# Patient Record
Sex: MALE | Race: WHITE
[De-identification: names, ages, dates, MRNs, and addresses within clinical notes are randomized per-mention and may not be internally consistent; named-entity substitution may affect disease eponyms.]

---

## 2017-10-01 ENCOUNTER — HOSPITAL ENCOUNTER (EMERGENCY)
Dept: HOSPITAL 50 - VM.ED | Age: 57
Discharge: HOME | End: 2017-10-01
Payer: COMMERCIAL

## 2017-10-01 VITALS — DIASTOLIC BLOOD PRESSURE: 78 MMHG | SYSTOLIC BLOOD PRESSURE: 125 MMHG

## 2017-10-01 DIAGNOSIS — M62.838: Primary | ICD-10-CM

## 2017-10-01 PROCEDURE — 96372 THER/PROPH/DIAG INJ SC/IM: CPT

## 2017-10-01 PROCEDURE — 99283 EMERGENCY DEPT VISIT LOW MDM: CPT

## 2017-10-01 PROCEDURE — 20552 NJX 1/MLT TRIGGER POINT 1/2: CPT

## 2019-07-16 ENCOUNTER — HOSPITAL ENCOUNTER (EMERGENCY)
Dept: HOSPITAL 50 - VM.ED | Age: 59
Discharge: HOME | End: 2019-07-16
Payer: COMMERCIAL

## 2019-07-16 VITALS — DIASTOLIC BLOOD PRESSURE: 65 MMHG | SYSTOLIC BLOOD PRESSURE: 110 MMHG | HEART RATE: 78 BPM

## 2019-07-16 DIAGNOSIS — J18.1: Primary | ICD-10-CM

## 2019-07-16 DIAGNOSIS — N13.2: ICD-10-CM

## 2019-07-16 LAB
ANION GAP SERPL CALC-SCNC: 19 MMOL/L (ref 10–20)
CHLORIDE SERPL-SCNC: 97 MMOL/L (ref 98–107)
SODIUM SERPL-SCNC: 134 MMOL/L (ref 136–145)

## 2019-07-16 PROCEDURE — 99284 EMERGENCY DEPT VISIT MOD MDM: CPT

## 2019-07-16 PROCEDURE — 71046 X-RAY EXAM CHEST 2 VIEWS: CPT

## 2019-07-16 PROCEDURE — 85025 COMPLETE CBC W/AUTO DIFF WBC: CPT

## 2019-07-16 PROCEDURE — 96374 THER/PROPH/DIAG INJ IV PUSH: CPT

## 2019-07-16 PROCEDURE — 96361 HYDRATE IV INFUSION ADD-ON: CPT

## 2019-07-16 PROCEDURE — 80053 COMPREHEN METABOLIC PANEL: CPT

## 2019-07-16 PROCEDURE — 96375 TX/PRO/DX INJ NEW DRUG ADDON: CPT

## 2019-07-16 PROCEDURE — 74176 CT ABD & PELVIS W/O CONTRAST: CPT

## 2019-07-16 PROCEDURE — 83605 ASSAY OF LACTIC ACID: CPT

## 2019-07-16 PROCEDURE — 82150 ASSAY OF AMYLASE: CPT

## 2019-07-16 PROCEDURE — 83690 ASSAY OF LIPASE: CPT

## 2019-07-16 PROCEDURE — 82550 ASSAY OF CK (CPK): CPT

## 2019-07-16 NOTE — CT
______________________________________________________________________________   

  

[id number] CT/CT Abdomen Pelvis WO IV  

EXAM: ABDOMEN AND PELVIS CT WITHOUT CONTRAST  

   

 INDICATION: Abdominal pain.  

   

 COMPARISON: None.  

   

 DISCUSSION: Volume loss and possible partially imaged infiltrates in the right  

 middle lobe. Small hiatus hernia.  

   

 Small fat-containing umbilical hernia.  

   

 There is a 2 mm calculus at the left ureterovesicular junction with minor left  

 hydroureteronephrosis. Small nonobstructing calculus in the lower pole of the  

 right kidney. No obstructing calculus in the right ureter right urinary  

 collecting system dilation.  

   

 Unenhanced images of the liver, gallbladder, spleen, pancreas, adrenal glands,  

 small bowel, large bowel and appendix are unremarkable.  

   

 Degenerative disc disease at L3-L4. The osseous structures are otherwise  

 unremarkable.  

   

 IMPRESSION:    

 1.  A 2 mm calculus at the left ureterovesicular junction results in minor left  

 hydroureteronephrosis.  

   

 Electronically signed by Lefty Cannon MD on 7/16/2019 8:30 AM  

   

  

Lefty Cannon MD                 

 07/16/19 0832    

  

Thank you for allowing us to participate in the care of your patient.

## 2019-07-16 NOTE — CR
______________________________________________________________________________   

  

[id number] RAD/RAD Chest PA And Lateral  

EXAM:  RAD Chest PA And Lateral  

   

 INDICATION:  COUGH.  

   

 COMPARISON:  None.  

   

 DISCUSSION:    

   

 Subtle patchy areas of parenchymal opacification the right mid lower lung. In  

 the clinical setting of infection, findings are consistent with pneumonia. Left  

 lung is clear.  

   

 IMPRESSION:  

 Right-sided pneumonia, described above.  

   

 Electronically signed by Ezekiel Loyd MD on 7/16/2019 8:27 AM  

   

  

Ezekiel Loyd MD                 

 07/16/19 0829    

  

Thank you for allowing us to participate in the care of your patient.

## 2019-07-16 NOTE — EDM.PDOC
ED HPI GENERAL MEDICAL PROBLEM





- General


Chief Complaint: Abdominal Pain


Stated Complaint: LT SIDE PAIN


Time Seen by Provider: 07/16/19 07:05


Source of Information: Reports: Patient


History Limitations: Reports: No Limitations





- History of Present Illness


INITIAL COMMENTS - FREE TEXT/NARRATIVE: 





Patient comes into the emergency department with complaints of abdominal 

discomfort. Patient states he started having left lower quadrant pain 

approximately 4 hours ago. Patient states that the discomfort is sharp and 

shooting. It comes and goes. The discomfort gets worse when ambulating feels 

better in the fetal position. He does also endorse getting nauseated and dry 

heaving. He also states that he has the cold sweats with this. He is also 

concerned that he has an upper respiratory infection for he's been coughing and 

feeling fatigued and run down for greater than a week. He actually had an 

appointment scheduled for later today regarding this. He describes cough as 

nonproductive, fatigue, and rundown. Patient also admits to having some urinary 

hesitancy yesterday. Which is new for him. Bowel movements have been within 

normal limits for him. Denies any other concerns or complaints at this time. 


Onset: Sudden


Location: Reports: Abdomen


Quality: Reports: Sharp, Stabbing


Severity: Moderate


Improves with: Reports: Immobilization


Worsens with: Reports: Movement


Associated Symptoms: Reports: Diaphoresis, Fever/Chills


  ** Left Lower Abdomen


Pain Score (Numeric/FACES): 8





  ** Left Abdomen


Pain Score (Numeric/FACES): 8





- Related Data


 Allergies











Allergy/AdvReac Type Severity Reaction Status Date / Time


 


No Known Drug Allergies Allergy  Other Verified 07/16/19 07:20











Home Meds: 


 Home Meds





Azithromycin [Zithromax] 250 mg PO DAILY 4 Days #4 tab 07/16/19 [Rx]


Ketorolac [Toradol] 10 mg PO TID PRN #20 tab 07/16/19 [Rx]


Tamsulosin HCl [Flomax] 0.4 mg PO DAILY 14 Days #14 cap.er.24h 07/16/19 [Rx]











Past Medical History





- Past Health History


Medical/Surgical History: Denies Medical/Surgical History





- Past Surgical History


Musculoskeletal Surgical History: Reports: Arthroscopic Knee





ED ROS GENERAL





- Review of Systems


Review Of Systems: See Below


Constitutional: Reports: Chills, Diaphoresis


HEENT: Reports: No Symptoms


Respiratory: Reports: No Symptoms


Cardiovascular: Reports: No Symptoms


Endocrine: Reports: No Symptoms


GI/Abdominal: Reports: Abdominal Pain


: Reports: Urinary Retention


Musculoskeletal: Reports: No Symptoms


Skin: Reports: No Symptoms


Neurological: Reports: No Symptoms


Psychiatric: Reports: No Symptoms


Hematologic/Lymphatic: Reports: No Symptoms


Immunologic: Reports: No Symptoms





ED EXAM, GI/ABD





- Physical Exam


Exam: See Below


Exam Limited By: No Limitations


General Appearance: Alert, WD/WN, No Apparent Distress


Head: Atraumatic, Normocephalic


Neck: Normal Inspection, Supple


Respiratory/Chest: No Respiratory Distress, No Accessory Muscle Use, Chest Non-

Tender, Decreased Breath Sounds


Cardiovascular: Normal Peripheral Pulses, Regular Rate, Rhythm, No Edema


GI/Abdominal Exam: Rebound, Tender


Back Exam: Normal Inspection, Full Range of Motion


Extremities: Normal Inspection, Normal Range of Motion, Non-Tender, No Pedal 

Edema, Normal Capillary Refill


Neurological: Alert, Oriented, Normal Gait


Psychiatric: Normal Affect, Normal Mood





Course





- Vital Signs


Last Recorded V/S: 


 Last Vital Signs











Temp  36.7 C   07/16/19 07:00


 


Pulse  78   07/16/19 07:00


 


Resp  24 H  07/16/19 07:00


 


BP  110/65   07/16/19 07:00


 


Pulse Ox  94 L  07/16/19 07:00














- Orders/Labs/Meds


Orders: 


 Active Orders 24 hr











 Category Date Time Status


 


 Peripheral IV Insertion Adult [OM.PC] Stat Oth  07/16/19 07:31 Ordered











Labs: 


 Laboratory Tests











  07/16/19 07/16/19 07/16/19 Range/Units





  07:32 07:32 07:32 


 


WBC  9.8    (4.0-10.0)  x10^3/uL


 


RBC  4.10 L    (4.5-6.0)  x10^6/uL


 


Hgb  14.1    (14.0-18.0)  g/dL


 


Hct  38.7 L    (40.0-52.0)  %


 


MCV  94.4 H    (78.0-93.0)  fL


 


MCH  34.4 H    (26.0-32.0)  pg


 


MCHC  36.4 H    (32.0-36.0)  g/dL


 


RDW Coeff of Tomas  11.6    (10.0-15.0)  %


 


Plt Count  277    (130-400)  x10^3/uL


 


Add Manual Diff  Yes    


 


Neutrophils % (Manual)  78    (50-80)  %


 


Lymphocytes % (Manual)  9 L    (25-50)  %


 


Monocytes % (Manual)  9    (2-11)  %


 


Eosinophils % (Manual)  1    (0-4)  %


 


Metamyelocytes %  1 H    (0)  %


 


Myelocytes %  2 H    (0)  %


 


Platelet Estimate  Adequate    


 


Sodium   134 L   (136-145)  mmol/L


 


Potassium   4.0   (3.5-5.1)  mmol/L


 


Chloride   97 L   ()  mmol/L


 


Carbon Dioxide   22   (21-32)  mmol/L


 


Anion Gap   19.0   (10-20)  mmol/L


 


BUN   15   (7-18)  mg/dL


 


Creatinine   1.1   (0.70-1.30)  mg/dL


 


Est Cr Clr Drug Dosing   TNP   


 


Estimated GFR (MDRD)   > 60   


 


Glucose   141 H   ()  mg/dL


 


Lactic Acid    1.6  (0.4-2.0)  mmol/L


 


Calcium   9.3   (8.5-10.1)  mg/dL


 


Corrected Calcium   9.94   (8.5-10.1)  mg/dL


 


Total Bilirubin   0.9   (0.2-1.0)  mg/dL


 


AST   38 H   (15-37)  U/L


 


ALT   69 H   (16-63)  U/L


 


Alkaline Phosphatase   204 H   ()  U/L


 


Creatine Kinase   66   ()  U/L


 


Total Protein   7.5   (6.4-8.2)  g/dL


 


Albumin   3.2 L   (3.4-5.0)  g/dL


 


Globulin   4.3   


 


Albumin/Globulin Ratio   0.74   


 


Amylase   53   ()  U/L


 


Lipase   102   ()  U/L











Meds: 


Medications














Discontinued Medications














Generic Name Dose Route Start Last Admin





  Trade Name Juanq  PRN Reason Stop Dose Admin


 


Azithromycin  500 mg  07/16/19 08:45  07/16/19 08:52





  Zithromax  PO  07/16/19 08:46  500 mg





  ONETIME ONE   Administration





     





     





     





     


 


Hydromorphone HCl  0.5 mg  07/16/19 07:33  07/16/19 07:41





  Dilaudid  IVPUSH  07/16/19 07:34  0.5 mg





  ONETIME ONE   Administration





     





     





     





     


 


Sodium Chloride  1,000 mls @ 150 mls/hr  07/16/19 07:45  07/16/19 07:43





  Normal Saline  IV   150 mls/hr





  ASDIRECTED JOSE ENRIQUE   Administration





     





     





     





     


 


Ketorolac Tromethamine  15 mg  07/16/19 08:35  07/16/19 08:52





  Toradol  IVPUSH  07/16/19 08:36  15 mg





  ONETIME ONE   Administration





     





     





     





     


 


Ondansetron HCl  4 mg  07/16/19 07:33  07/16/19 07:43





  Zofran  IVPUSH  07/16/19 07:34  4 mg





  ONETIME ONE   Administration





     





     





     





     


 


Sodium Chloride  10 ml  07/16/19 07:32  





  Saline Flush  FLUSH   





  ASDIRECTED PRN   





  Keep Vein Open   





     





     





     


 


Tamsulosin HCl  0.4 mg  07/16/19 08:38  07/16/19 08:52





  Flomax  PO  07/16/19 08:39  0.4 mg





  ONETIME ONE   Administration





     





     





     





     














Departure





- Departure


Time of Disposition: 08:45


Disposition: Home, Self-Care 01


Clinical Impression: 


 Kidney stone on left side, Hydroureteronephrosis





Pneumonia


Qualifiers:


 Pneumonia type: due to unspecified organism Laterality: right Lung location: 

lower lobe of lung Qualified Code(s): J18.1 - Lobar pneumonia, unspecified 

organism








- Discharge Information


*PRESCRIPTION DRUG MONITORING PROGRAM REVIEWED*: Not Applicable


*COPY OF PRESCRIPTION DRUG MONITORING REPORT IN PATIENT JANUSZ: Not Applicable


Prescriptions: 


Azithromycin [Zithromax] 250 mg PO DAILY 4 Days #4 tab


Ketorolac [Toradol] 10 mg PO TID PRN #20 tab


 PRN Reason: Pain


Tamsulosin HCl [Flomax] 0.4 mg PO DAILY 14 Days #14 cap.er.24h


Instructions:  Kidney Stones, Easy-to-Read, Community-Acquired Pneumonia, Adult


Referrals: 


Yaneth Serrano DO [Primary Care Provider] - 


Forms:  ED Department Discharge


Additional Instructions: 


1. rest 


2. increase your water intake 


3. take antibiotics as prescribed


4. Take a probiotic while you are on antibiotics to help promote a healthy GI 

tract


5. Follow up with PCP as needed


6. Use the strainer to collect the stone and bring the stone in for analysis if 

able to capture


7. Activity and diet as tolerated. 


8. Call with any questions or concerns.  





- My Orders


Last 24 Hours: 


My Active Orders





07/16/19 07:31


Peripheral IV Insertion Adult [OM.PC] Stat 














- Assessment/Plan


Last 24 Hours: 


My Active Orders





07/16/19 07:31


Peripheral IV Insertion Adult [OM.PC] Stat 











Assessment:: 





1. Left lower quadrant pain 


2. Cough


Plan: 





1. Labs completed in the ER. Results reviewed with the patient


2. IV started and fluids administered


3. Dilaudid and Zofran ordered


4. CT of abdomen and pelvis. Results reviewed with the patient 


5.

## 2021-10-20 NOTE — EDM.PDOC
ED HPI GENERAL MEDICAL PROBLEM





- General


Chief Complaint: Neck Problem


Stated Complaint: NECK PAIN


Time Seen by Provider: 10/01/17 10:38


Source of Information: Reports: Patient, Family, RN, RN Notes Reviewed


History Limitations: Reports: No Limitations





- History of Present Illness


INITIAL COMMENTS - FREE TEXT/NARRATIVE: 


Patient presents to the ED at [facility] with a 2 day history of severe 

neck pain. Patient denies any trauma or injury. He states it happened 

spontaneously. He is unable to rotate or flex. Pain starts at the base of the 

skull (lower occipital) and radiate to the upper parietal. 


Onset: Sudden


Onset Date: 09/29/17


Duration: Constant, Getting Worse


Location: Reports: Neck


Quality: Reports: Burning, Throbbing


Severity: Moderate


Improves with: Reports: None


Worsens with: Reports: Movement


Context: Denies: Activity, Exercise, Lifting, Trauma


Associated Symptoms: Reports: No Other Symptoms





- Related Data


 Allergies











Allergy/AdvReac Type Severity Reaction Status Date / Time


 


No Known Drug Allergies Allergy  Other Verified 10/01/17 10:36











Home Meds: 


 Home Meds





. [No Known Home Meds]  10/01/17 [History]











Past Medical History





- Past Health History


Medical/Surgical History: Denies Medical/Surgical History





Social & Family History





- Tobacco Use


Smoking Status *Q: Never Smoker


Years of Tobacco use: 40


Second Hand Smoke Exposure: No





- Alcohol Use


Days Per Week of Alcohol Use: 1





- Recreational Drug Use


Recreational Drug Use: No


Drug Use in Last 12 Months: No





ED ROS GENERAL





- Review of Systems


Review Of Systems: See Below


Constitutional: Denies: Fever, Chills, Weakness


Respiratory: Denies: Shortness of Breath, Cough


Cardiovascular: Denies: Chest Pain, Palpitations


Musculoskeletal: Reports: Neck Pain, Muscle Pain, Muscle Stiffness


Skin: Reports: No Symptoms


Neurological: Denies: Headache, Numbness, Paresthesia, Tingling





ED EXAM, UPPER BACK/NECK PAIN





- Physical Exam


Exam: See Below


Exam Limited By: No Limitations


General Appearance: Alert, No Apparent Distress


Head Exam: Atraumatic, Normocephalic


Neck Exam: Limited Range of Motion, Muscle Spasm, Paraspinous Muscle Tender, 

Stiff Neck, Tenderness


Nexus Criteria: No: Posterior, Midline Cervical Tenderness, Altered Level of 

Consciousness, Focal Neurological Deficit


Cardiovascular/Respiratory: Regular Rate, Rhythm


Neurologic: Alert, Oriented x 3


Skin Exam: Normal Color, Warm/Dry





Course





- Vital Signs


Last Recorded V/S: 


 Last Vital Signs











Temp  37.3 C   10/01/17 10:29


 


Pulse  95   10/01/17 10:29


 


Resp  20   10/01/17 10:29


 


BP  125/78   10/01/17 10:29


 


Pulse Ox      














- Orders/Labs/Meds


Orders: 


 Active Orders 24 hr











 Category Date Time Status


 


 Bupivacaine 0.5% [Marcaine 0.5%] Med  10/01/17 10:41 Ordered





 30 ml INJECT ASDIRECTED PRN   








 Medication Orders





Bupivacaine HCl (Marcaine 0.5%)  30 ml INJECT ASDIRECTED PRN


   PRN Reason: Other


   Last Admin: 10/01/17 10:54 Dose:  5 ml








Meds: 


Medications











Generic Name Dose Route Start Last Admin





  Trade Name Freq  PRN Reason Stop Dose Admin


 


Bupivacaine HCl  30 ml  10/01/17 10:41  10/01/17 10:54





  Marcaine 0.5%  INJECT   5 ml





  ASDIRECTED PRN   Administration





  Other   














Discontinued Medications














Generic Name Dose Route Start Last Admin





  Trade Name Freq  PRN Reason Stop Dose Admin


 


Cyclobenzaprine HCl  1 packet  10/01/17 10:49  10/01/17 10:56





  Take Home: Cyclobenzaprine 10 Mg, 4 Tab Pack  PO  10/01/17 10:50  1 packet





  ONETIME ONE   Administration


 


Lidocaine HCl  30 ml  10/01/17 10:40  10/01/17 10:54





  Xylocaine-Mpf 1%  INJECT  10/01/17 10:41  5 ml





  ONETIME ONE   Administration


 


Orphenadrine Citrate  60 mg  10/01/17 10:47  10/01/17 10:56





  Norflex  IM  10/01/17 10:48  60 mg





  Q12H ONE   Administration














Departure





- Departure


Time of Disposition: 11:03


Disposition: Home, Self-Care 01


Condition: Fair


Clinical Impression: 


 Muscle spasms of neck








- Discharge Information


Instructions:  Muscle Cramps and Spasms


Referrals: 


PCP,None [Primary Care Provider] - 


Forms:  ED Department Discharge


Additional Instructions: 


1. Stay well hydrated and rest


2. May use Tylenol as needed


3. Use muscle relaxers carefully, they may make you drowsy


4. Apply heat to neck to help with spasm


5. If symptoms persist, would recommend [facility], you may need an MRI 

and/or other testing





- My Orders


Last 24 Hours: 


My Active Orders





10/01/17 10:41


Bupivacaine 0.5% [Marcaine 0.5%]   30 ml INJECT ASDIRECTED PRN 














- Assessment/Plan


Last 24 Hours: 


My Active Orders





10/01/17 10:41


Bupivacaine 0.5% [Marcaine 0.5%]   30 ml INJECT ASDIRECTED PRN [facility] notified we do not do authorization for BC/BS  For Cardiac Rehab

## 2025-06-24 ENCOUNTER — HOSPITAL ENCOUNTER (EMERGENCY)
Dept: HOSPITAL 50 - VM.ED | Age: 65
Discharge: HOME | End: 2025-06-24
Payer: COMMERCIAL

## 2025-06-24 VITALS — SYSTOLIC BLOOD PRESSURE: 118 MMHG | DIASTOLIC BLOOD PRESSURE: 61 MMHG | HEART RATE: 75 BPM

## 2025-06-24 DIAGNOSIS — R10.9: Primary | ICD-10-CM

## 2025-06-24 LAB
ALBUMIN SERPL-MCNC: 3.8 G/DL (ref 3.4–5)
ALBUMIN/GLOB SERPL: 1.23 {RATIO}
ALP SERPL-CCNC: 123 U/L (ref 46–116)
ALT SERPL-CCNC: 31 U/L (ref 16–63)
AMM URATE CRY #/AREA URNS HPF: (no result) /HPF
AMORPH SED URNS QL MICRO: (no result)
ANION GAP SERPL CALC-SCNC: 15.2 MMOL/L (ref 5–15)
APPEARANCE UR: CLEAR
AST SERPL-CCNC: 24 U/L (ref 15–37)
BACTERIA URNS QL MICRO: (no result) /HPF
BASOPHILS # BLD AUTO: 0 X10^3/UL (ref 0–0.2)
BASOPHILS NFR BLD AUTO: 0.6 % (ref 0.2–1.2)
BILIRUB SERPL-MCNC: 0.6 MG/DL (ref 0.2–1)
BILIRUB UR STRIP-MCNC: NEGATIVE MG/DL
BROAD CASTS #/AREA URNS LPF: (no result) /[LPF]
BUN SERPL-MCNC: 18 MG/DL (ref 7–18)
CA CARBONATE CRY #/AREA URNS HPF: (no result) /[HPF]
CA PHOS CRY #/AREA URNS HPF: (no result) /[HPF]
CALCIUM SERPL-MCNC: 9.2 MG/DL (ref 8.5–10.1)
CAOX CRY #/AREA URNS HPF: (no result) /HPF
CHLORIDE SERPL-SCNC: 106 MMOL/L (ref 98–107)
CO2 SERPL-SCNC: 23 MMOL/L (ref 21–32)
COARSE GRAN CASTS #/AREA URNS LPF: (no result) /HPF
COLOR UR: YELLOW
CREAT CL 24H UR+SERPL-VRATE: 52.26 ML/MIN
CREAT SERPL-MCNC: 1.3 MG/DL (ref 0.7–1.3)
CRYSTALS #/AREA URNS HPF: (no result) /[HPF]
CYSTINE CRY #/AREA URNS HPF: (no result) /[HPF]
EGFRCR SERPLBLD CKD-EPI 2021: 61 ML/MIN (ref 60–?)
EOSINOPHIL # BLD AUTO: 0.4 X10^3/UL (ref 0–0.5)
EOSINOPHIL NFR BLD AUTO: 5.6 % (ref 0–4)
EPI CELLS #/AREA URNS HPF: (no result) /[HPF]
EPITH CASTS URNS QL MICRO: (no result)
FATTY CASTS #/AREA UR COMP ASSIST: (no result) /[HPF]
FINE GRAN CASTS #/AREA URNS LPF: (no result) /HPF
GLOBULIN SER-MCNC: 3.1 G/DL
GLUCOSE SERPL-MCNC: 141 MG/DL (ref 70–99)
GLUCOSE UR STRIP-MCNC: NEGATIVE MG/DL
GRAN CASTS #/AREA URNS LPF: (no result) /[LPF]
HCT VFR BLD AUTO: 41.6 % (ref 40–52)
HGB BLD-MCNC: 14.7 G/DL (ref 14–18)
HYALINE CASTS #/AREA URNS LPF: (no result) /[LPF]
IMM GRANULOCYTES # BLD: 0.01 X10^3/UL (ref 0–0.07)
IMM GRANULOCYTES NFR BLD: 0.1 % (ref 0–0.43)
KETONES UR STRIP-MCNC: (no result) MG/DL
LEUCINE CRY #/AREA URNS HPF: (no result) /[HPF]
LEUKOCYTE ESTERASE UR QL STRIP: NEGATIVE
LIPASE SERPL-CCNC: 36 U/L (ref 19–71)
LYMPHOCYTES # BLD AUTO: 1.8 X10^3/UL (ref 1–4.8)
LYMPHOCYTES NFR BLD AUTO: 24.5 % (ref 25–50)
MCH RBC QN AUTO: 32.7 PG (ref 26–32)
MCHC RBC AUTO-ENTMCNC: 35.3 G/DL (ref 32–36)
MCHC RBC AUTO-ENTMCNC: 92.7 FL (ref 78–93)
MONOCYTES # BLD AUTO: 0.6 X10^3/UL (ref 0–0.8)
MONOCYTES NFR BLD AUTO: 8.3 % (ref 2–11)
MUCOUS THREADS URNS QL MICRO: (no result) /LPF
NEUTROPHILS # BLD AUTO: 4.4 X10^3/UL (ref 1.8–7.7)
NEUTROPHILS NFR BLD AUTO: 60.9 % (ref 50–80)
NITRITE UR QL: NEGATIVE
OTHER ELEMENTS URNS MICRO: (no result)
PH UR STRIP: 6.5 [PH] (ref 5–8)
PLATELET # BLD AUTO: 199 X10^3/UL (ref 130–400)
POTASSIUM SERPL-SCNC: 4.2 MMOL/L (ref 3.5–5.1)
PROT SERPL-MCNC: 6.9 G/DL (ref 6.4–8.2)
PROT UR STRIP-MCNC: NEGATIVE MG/DL
RBC # BLD AUTO: 4.49 X10^6/UL (ref 4.5–6)
RBC # URNS HPF: (no result) /HPF
RBC CASTS #/AREA URNS HPF: (no result) /HPF
RBC UR QL: (no result)
RENAL EPI CELLS #/AREA URNS HPF: (no result) /HPF
SODIUM SERPL-SCNC: 140 MMOL/L (ref 136–145)
SP GR UR STRIP: 1.02 (ref 1–1.03)
SQUAMOUS #/AREA URNS HPF: (no result) /HPF
TRI-PHOS CRY #/AREA URNS HPF: (no result) /HPF
TRICHOMONAS #/AREA URNS HPF: (no result) /HPF
TYROSINE CRYSTALS [#/AREA] IN URINE SEDIMENT BY MICROSCOPY HIGH POWER FIELD: (no result) /[HPF]
URATE CRY #/AREA URNS HPF: (no result) /HPF
UROBILINOGEN UR STRIP-ACNC: 0.2 EU/DL
WAXY CASTS #/AREA URNS HPF: (no result) /HPF
WBC # BLD AUTO: 7.2 X10^3/UL (ref 4–10)
WBC CASTS #/AREA URNS HPF: (no result) /HPF
WBC UR QL: (no result) /HPF
YEAST BUDDING #/AREA URNS HPF: (no result) /HPF
YEAST HYPHAE URNS QL MICRO: (no result) /HPF
YEAST URNS QL MICRO: (no result) /HPF

## 2025-06-24 RX ADMIN — KETOROLAC TROMETHAMINE ONE MG: 30 INJECTION, SOLUTION INTRAMUSCULAR at 07:04

## 2025-06-24 RX ADMIN — HYDROMORPHONE HYDROCHLORIDE ONE MG: 1 INJECTION, SOLUTION INTRAMUSCULAR; INTRAVENOUS; SUBCUTANEOUS at 07:02

## 2025-06-24 RX ADMIN — SODIUM CHLORIDE ONE MG: 9 INJECTION, SOLUTION INTRAVENOUS at 07:04
